# Patient Record
Sex: FEMALE | Race: WHITE | Employment: PART TIME | URBAN - METROPOLITAN AREA
[De-identification: names, ages, dates, MRNs, and addresses within clinical notes are randomized per-mention and may not be internally consistent; named-entity substitution may affect disease eponyms.]

---

## 2019-09-17 ENCOUNTER — TELEPHONE (OUTPATIENT)
Dept: SURGERY | Facility: CLINIC | Age: 34
End: 2019-09-17

## 2019-09-17 NOTE — TELEPHONE ENCOUNTER
Reason for call:  Other   Patient called regarding (reason for call): appointment  Additional comments: PT WENT TO [facility] SEMINAR 9/12 AND WOULD LIKE TO SCHEDULE     Phone number to reach patient:  Cell number on file:    Telephone Information:   Mobile [phone]       Best Time:  ANYTIME    Can we leave a detailed message on this number?  YES

## 2020-07-01 ENCOUNTER — TELEPHONE (OUTPATIENT)
Dept: SURGERY | Facility: CLINIC | Age: 35
End: 2020-07-01

## 2020-07-01 NOTE — TELEPHONE ENCOUNTER
Reason for call:  Other   Patient called regarding (reason for call): appointment  Additional comments: Please attach questionnaires for upcoming visit    Phone number to reach patient:  Home number on file [phone] (home)    Best Time:  Anytime     Can we leave a detailed message on this number?  Not Applicable    Travel screening: Not Applicable

## 2020-07-16 NOTE — PROGRESS NOTES
"New Bariatric Surgery Consultation Note  Video visit    2020    RE: Sherri Flores  MR#: [id number]  : 1985      Referring provider:       2020   Who referred you? Dr. Latosha Orozco (sp)       Chief Complaint/Reason for visit: evaluation for possible weight loss surgery    Dear Zay Varela MD (General),    I had the pleasure of seeing your patient, Sherri Flores, to evaluate her obesity and consider her for possible weight loss surgery. As you know, Sherri Flores is 35 year old.  She has a height of 5' 5\"[Pt reported[, a weight of 270 lbs 0 oz, and calculated Body mass index is 44.93 kg/m .        HISTORY OF PRESENT ILLNESS:  Weight Loss History Reviewed with Patient 2020   How long have you been overweight? Since early childhood   What is the most that you have ever weighed? 300   What is the most weight you have lost? 50   I have tried the following methods to lose weight Watching portions or calories, Exercise, Atkins type diet (low carb/high protein), Slimfast, OTC Medications   I have tried the following weight loss medications? (Check all that apply) None   Have you ever had weight loss surgery? No       CO-MORBIDITIES OF OBESITY INCLUDE:     2020   I have the following health issues associated with obesity: Pre-Diabetes, Suspected sleep apnea, Polycystic Ovarian Syndrome, Infertility, GERD (Reflux), Fatty Liver       PAST MEDICAL HISTORY:  Past Medical History:   Diagnosis Date     GERD (gastroesophageal reflux disease)      Infertility associated with anovulation      PCOS (polycystic ovarian syndrome)      Pre-diabetes        PAST SURGICAL HISTORY:  Past Surgical History:   Procedure Laterality Date     BREAST SURGERY      Reduction      ORTHOPEDIC SURGERY Right     Ankle surgery      ORTHOPEDIC SURGERY Right     Knee surgery      TONSILLECTOMY         FAMILY HISTORY:   No family history on file.    SOCIAL HISTORY:   Social History Questions Reviewed With " Patient 7/22/2020   Which best describes your employment status (select all that apply) I work part-time   If you work, what is your occupation? I am a    Which best describes your marital status: single   Do you have children? No   Who do you have in your support network that can be available to help you for the first 2 weeks after surgery? Mother and Father (both have hd the surgery themselves)   Who can you count on for support throughout your weight loss surgery journey? My boyfriend and family   Can you afford 3 meals a day?  Yes   Can you afford 50-60 dollars a month for vitamins? Yes       HABITS:     7/22/2020   How often do you drink alcohol? Monthly or less   If you do drink alcohol, how many drinks might you have in a day? (one drink = 5 oz. wine, 1 can/bottle of beer, 1 shot liquor) 1 or 2   Have you ever used any of the following nicotine products? No   Have you or are you currently using street drugs or prescription strength medication for which you do not have a prescription for? Yes- marijuana   Do you have a history of chemical dependency (alcohol or drug abuse)? No     Patient states her partner has seen her stop breathing in her sleep. She has never been tested for Sleep apnea.  Stop-bang score: 5  [place] sleepiness score: 7    PSYCHOLOGICAL HISTORY:   Psychological History Reviewed With Patient 7/22/2020   Have you ever attempted suicide? 2016   Have you had thoughts of suicide in the past year? No   Have you ever been hospitalized for mental illness or a suicide attempt? 2016   Do you have a history of chronic pain? Yes   Have you ever been diagnosed with fibromyalgia? Yes   Are you currently being treated for any of the following? (select all that apply) Depression, Anxiety, Bipolar   Are you currently seeing a therapist or counselor?  No   Are you currently seeing a psychiatrist? No       ROS:     7/22/2020   Skin:  Skin fold rashes (groin or other folds), Leg swelling, Varicose veins    HEENT: Headaches, Teeth- 1 broken tooth   Musculoskeletal: Joint Pain, Back pain, Limited mobility, Swelling of legs   Cardiovascular: Shortness of breath with activity   Pulmonary: Shortness of breath with activity, Snoring, Awaken from sleep to catch your breath, Excessively sleep during the day   Gastrointestinal: Heartburn, Reflux, Diarrhea, Constipation   Genitourinary: Stress incontinence (losing urine when coughing, sneezing, etc.)   Hematological: None of the above   Neurological: None of the above   Female only: Loss of menstrual cycles, Excessive menstrual bleeding, Irregular menstrual cycles, Polycystic ovarian syndrome (PCOS), Birth control       EATING BEHAVIORS:     7/22/2020   Have you or anyone else thought that you had an eating disorder? She admits binge eating weekly   If you answered yes to the previous eating disorder question, select the types that apply from this list: Binge Eating   Do you currently binge eat (eat a large amount of food in a short time)? Yes- about once a week   Are you an emotional eater? Yes   Do you get up to eat after falling asleep? No       EXERCISE:     7/22/2020   How often do you exercise? 1 to 2 times per week   What is the duration of your exercise (in minutes)? 20 Minutes   What types of exercise do you do? walking, climbing stairs at work, other   What keeps you from being more active?  Pain, My ability to walk or move around is limited, Shortness of breath, Too tired, Worried people will look at me       MEDICATIONS:  Current Outpatient Medications   Medication Sig Dispense Refill     amphetamine-dextroamphetamine (ADDERALL XR) 30 MG 24 hr capsule Take 30 mg by mouth       amphetamine-dextroamphetamine (ADDERALL) 20 MG tablet Take 20 mg by mouth       ARIPiprazole (ABILIFY) 10 MG tablet Take 10 mg by mouth daily       BIOTIN PO Take 5,000 mg by mouth       drospirenone-ethinyl estradiol (LUZ) 3-0.03 MG tablet Take 1 tablet by mouth daily       DULoxetine  "(CYMBALTA) 60 MG capsule Take 60 mg by mouth daily       lamoTRIgine (LAMICTAL) 25 MG tablet Take 25 mg by mouth daily       LORazepam (ATIVAN) 1 MG tablet Take 1 mg by mouth every 6 hours as needed for anxiety       metFORMIN (GLUCOPHAGE) 500 MG tablet Take 500 mg by mouth 3 times daily (with meals)       omeprazole (PRILOSEC) 20 MG DR capsule Take 20 mg by mouth daily       solifenacin (VESICARE) 10 MG tablet Take 10 mg by mouth daily       VITAMIN D PO Take 5,000 Units by mouth daily         ALLERGIES:  Allergies   Allergen Reactions     Amoxicillin      Cefzil [Cefprozil]      Clindamycin      Dilaudid [Hydromorphone] Nausea and Vomiting     Tegaderm Transparent Dressing (Informational Only)      Vicodin [Hydrocodone-Acetaminophen] Nausea and Vomiting       LABS/IMAGING/MEDICAL RECORDS REVIEW: A1C 6.4 on 9/13/19, normal CBC on 7/24/20. Severe hepatic steatosis on US 7/20    PHYSICAL EXAM:  Ht 5' 5\" (1.651 m)   Wt 270 lb (122.5 kg)   BMI 44.93 kg/m    EYES: Eyes grossly normal to inspection.  No discharge or erythema, or obvious scleral/conjunctival abnormalities.  RESP: No audible wheeze, cough, or visible cyanosis.  No visible retractions or increased work of breathing.    SKIN: Visible skin clear. No significant rash, abnormal pigmentation or lesions.  NEURO: Cranial nerves grossly intact.  Mentation and speech appropriate for age.  PSYCH: Mentation appears normal, affect normal/bright, judgement and insight intact, normal speech and appearance well-groomed.    In summary, Sherri Flores has Class III obesity with a body mass index of Body mass index is 44.93 kg/m . kg/m2 and the comorbidities stated above. She completed an informational seminar and is a candidate for the laparoscopic gastric bypass.  She will have to complete the following pre-requisites:    Received weight loss goal of 5 lb prior to surgery.  Achieve clearance from dietitian to see surgeon.  Have preoperative laboratory tests " drawn.  Psychological Evaluation with MMPI and clearance for weight loss surgery..    Today in the office we discussed gastric bypass surgery.  Preoperative, perioperative, and postoperative processes, management, and follow up were addressed.  Risks and benefits were outlined including the risk of death, staple line leak (1-2%), PE, DVT, ulcer, N/V, stricture, hernia, wound infection, weight regain, and vitamin deficiencies. I emphasized exercise and activity along with appropriate food choice as the main foundation for weight loss with surgery providing surgical reinforcement of this. All questions were answered.  A goal sheet and support group handout were given to the patient.     She will have a sleep study done.      Once the patient has completed the requirements in their task list and there are no further recommendations, the pt will be allowed to see the surgeon of her choice for consultation on the laparoscopic gastric bypass surgery. Patient verbalizes understanding of the process to surgery and expectations for the postoperative period including the need for lifelong lifestyle changes, vitamin supplementation, and laboratory monitoring.  Sincerely,     LEAH Fowler MD    I spent a total of 45 minutes face to face with the patient during today's office visit. Over 50% of this time was spent counseling the patient and/or coordinating care.

## 2020-07-27 ENCOUNTER — VIRTUAL VISIT (OUTPATIENT)
Dept: SURGERY | Facility: CLINIC | Age: 35
End: 2020-07-27
Payer: COMMERCIAL

## 2020-07-27 VITALS — BODY MASS INDEX: 44.98 KG/M2 | HEIGHT: 65 IN | WEIGHT: 270 LBS

## 2020-07-27 DIAGNOSIS — Z13.0 SCREENING FOR IRON DEFICIENCY ANEMIA: ICD-10-CM

## 2020-07-27 DIAGNOSIS — Z13.228 SCREENING FOR ENDOCRINE, NUTRITIONAL, METABOLIC AND IMMUNITY DISORDER: ICD-10-CM

## 2020-07-27 DIAGNOSIS — Z13.0 SCREENING FOR ENDOCRINE, NUTRITIONAL, METABOLIC AND IMMUNITY DISORDER: ICD-10-CM

## 2020-07-27 DIAGNOSIS — K21.9 GASTROESOPHAGEAL REFLUX DISEASE, ESOPHAGITIS PRESENCE NOT SPECIFIED: ICD-10-CM

## 2020-07-27 DIAGNOSIS — R73.03 PRE-DIABETES: ICD-10-CM

## 2020-07-27 DIAGNOSIS — E66.01 MORBID OBESITY (H): Primary | ICD-10-CM

## 2020-07-27 DIAGNOSIS — K76.0 HEPATIC STEATOSIS: ICD-10-CM

## 2020-07-27 DIAGNOSIS — Z13.21 SCREENING FOR ENDOCRINE, NUTRITIONAL, METABOLIC AND IMMUNITY DISORDER: ICD-10-CM

## 2020-07-27 DIAGNOSIS — E28.2 PCOS (POLYCYSTIC OVARIAN SYNDROME): ICD-10-CM

## 2020-07-27 DIAGNOSIS — E66.01 MORBID OBESITY (H): ICD-10-CM

## 2020-07-27 DIAGNOSIS — Z13.29 SCREENING FOR ENDOCRINE, NUTRITIONAL, METABOLIC AND IMMUNITY DISORDER: ICD-10-CM

## 2020-07-27 DIAGNOSIS — G47.30 SLEEP APNEA, UNSPECIFIED TYPE: ICD-10-CM

## 2020-07-27 PROBLEM — M79.7 FIBROMYALGIA: Status: ACTIVE | Noted: 2020-07-27

## 2020-07-27 PROBLEM — M50.30 DDD (DEGENERATIVE DISC DISEASE), CERVICAL: Status: ACTIVE | Noted: 2020-07-27

## 2020-07-27 PROCEDURE — 97802 MEDICAL NUTRITION INDIV IN: CPT | Mod: 95 | Performed by: DIETITIAN, REGISTERED

## 2020-07-27 PROCEDURE — 99243 OFF/OP CNSLTJ NEW/EST LOW 30: CPT | Mod: 95 | Performed by: FAMILY MEDICINE

## 2020-07-27 RX ORDER — ARIPIPRAZOLE 10 MG/1
10 TABLET ORAL DAILY
COMMUNITY

## 2020-07-27 RX ORDER — DROSPIRENONE AND ETHINYL ESTRADIOL 0.03MG-3MG
1 KIT ORAL DAILY
COMMUNITY

## 2020-07-27 RX ORDER — LAMOTRIGINE 25 MG/1
25 TABLET ORAL DAILY
COMMUNITY

## 2020-07-27 RX ORDER — DEXTROAMPHETAMINE SACCHARATE, AMPHETAMINE ASPARTATE MONOHYDRATE, DEXTROAMPHETAMINE SULFATE AND AMPHETAMINE SULFATE 7.5; 7.5; 7.5; 7.5 MG/1; MG/1; MG/1; MG/1
30 CAPSULE, EXTENDED RELEASE ORAL
COMMUNITY
Start: 2019-09-13

## 2020-07-27 RX ORDER — DEXTROAMPHETAMINE SACCHARATE, AMPHETAMINE ASPARTATE, DEXTROAMPHETAMINE SULFATE AND AMPHETAMINE SULFATE 5; 5; 5; 5 MG/1; MG/1; MG/1; MG/1
20 TABLET ORAL
COMMUNITY
Start: 2019-09-13

## 2020-07-27 RX ORDER — DULOXETIN HYDROCHLORIDE 60 MG/1
60 CAPSULE, DELAYED RELEASE ORAL DAILY
COMMUNITY

## 2020-07-27 RX ORDER — LORAZEPAM 1 MG/1
1 TABLET ORAL EVERY 6 HOURS PRN
COMMUNITY

## 2020-07-27 RX ORDER — SOLIFENACIN SUCCINATE 10 MG/1
10 TABLET, FILM COATED ORAL DAILY
COMMUNITY

## 2020-07-27 ASSESSMENT — MIFFLIN-ST. JEOR: SCORE: 1925.59

## 2020-07-27 NOTE — PROGRESS NOTES
"Sherri Flores is a 35 year old female who is being evaluated via a billable video visit.      The patient has been notified of following:     \"This video visit will be conducted via a call between you and your physician/provider. We have found that certain health care needs can be provided without the need for an in-person physical exam.  This service lets us provide the care you need with a video conversation.  If a prescription is necessary we can send it directly to your pharmacy.  If lab work is needed we can place an order for that and you can then stop by our lab to have the test done at a later time.    Video visits are billed at different rates depending on your insurance coverage.  Please reach out to your insurance provider with any questions.    If during the course of the call the physician/provider feels a video visit is not appropriate, you will not be charged for this service.\"    Patient has given verbal consent for Video visit? Yes  How would you like to obtain your AVS? MyChart  If you are dropped from the video visit, the video invite should be resent to: Text to cell phone: [phone]  Will anyone else be joining your video visit? No        Video-Visit Details    Type of service:  Video Visit    Video Start Time: 2:05  Video End Time: 2:51    Originating Location (pt. Location): Home    Distant Location (provider location):  [facility]     Platform used for Video Visit: Johnny"

## 2020-07-27 NOTE — PROGRESS NOTES
"Sherri Flores is a 35 year old female who is being evaluated via a billable video visit.      The patient has been notified of following:     \"This video visit will be conducted via a call between you and your physician/provider. We have found that certain health care needs can be provided without the need for an in-person physical exam.  This service lets us provide the care you need with a video conversation.  If a prescription is necessary we can send it directly to your pharmacy.  If lab work is needed we can place an order for that and you can then stop by our lab to have the test done at a later time.    Video visits are billed at different rates depending on your insurance coverage.  Please reach out to your insurance provider with any questions.    If during the course of the call the physician/provider feels a video visit is not appropriate, you will not be charged for this service.\"    Patient has given verbal consent for Video visit? Yes  How would you like to obtain your AVS? MyChart  If you are dropped from the video visit, the video invite should be resent to: Text to cell phone: [phone]  Will anyone else be joining your video visit? No      Video-Visit Details    Type of service:  Video Visit    Video Start Time: 1:12 pm  Video End Time: 1:57 PM    Originating Location (pt. Location): Home    Distant Location (provider location):  [facility]     Platform used for Video Visit: Rosa Fowler MD        "

## 2020-07-27 NOTE — PATIENT INSTRUCTIONS
Sherri Flores  July 27, 2020        Bariatric Task List      Required Weight loss:    Lose 5 lbs prior to surgery.  Goal Weight: 265# lbs    Tasks:    Have preoperative laboratory tests drawn.     Psychological Evaluation with MMPI and clearance for weight loss surgery.    Achieve clearance from dietitian to see surgeon.    A total of 6 structured dietitian weight loss visits are required by your insurance.    Sleep consult and sleep study. You will need to use your CPAP for 30 days prior to surgery and bring it to the hospital if you test positive for moderate-severe sleep apnea.

## 2020-07-27 NOTE — PROGRESS NOTES
"  Sherri Flores is a 35 year old female who is being evaluated via a billable video visit.      The patient has been notified of following:     \"This video visit will be conducted via a call between you and your physician/provider. We have found that certain health care needs can be provided without the need for an in-person physical exam.  This service lets us provide the care you need with a video conversation.  If a prescription is necessary we can send it directly to your pharmacy.  If lab work is needed we can place an order for that and you can then stop by our lab to have the test done at a later time.    Video visits are billed at different rates depending on your insurance coverage.  Please reach out to your insurance provider with any questions.    If during the course of the call the physician/provider feels a video visit is not appropriate, you will not be charged for this service.\"    Patient has given verbal consent for Video visit? Yes  How would you like to obtain your AVS? MyChart  If you are dropped from the video visit, the video invite should be resent to: Other e-mail: My chart  Will anyone else be joining your video visit? No      Video-Visit Details    Type of service:  Video Visit    Video Start Time: 2:05  Video End Time: 2:51    Originating Location (pt. Location): Home    Distant Location (provider location):  [facility] [facility]     Platform used for Video Visit: Johnny          New Bariatric Nutrition Consultation Note    Reason For Visit: Nutrition Assessment    Sherri Flores is a 35 year old presenting today for new bariatric nutrition consult.  Pt is interested in laparoscopic sleeve gastrectomy.  Patient is accompanied by self.    Support System Reviewed With Patient 7/22/2020   Who do you have in your support network that can be available to help you for the first 2 weeks after surgery? Mother and Father (both have hd the surgery themselves)   Who can you " "count on for support throughout your weight loss surgery journey? My boyfriend and family       ANTHROPOMETRICS:  Estimated body mass index is 44.93 kg/m  as calculated from the following:    Height as of an earlier encounter on 7/27/20: 1.651 m (5' 5\").    Weight as of an earlier encounter on 7/27/20: 122.5 kg (270 lb).    Required weight loss goal pre-op: 5 lbs from initial consult weight (goal weight 265 lbs or less before surgery)       7/22/2020   I have tried the following methods to lose weight Watching portions or calories, Exercise, Atkins type diet (low carb/high protein), Slimfast, OTC Medications       Weight Loss Questions Reviewed With Patient 7/22/2020   How long have you been overweight? Since early childhood       SUPPLEMENT INFORMATION:  5000 international unit(s)  vitamin D  1 tablet Biotin    NUTRITION HISTORY:  Recall Diet Questions Reviewed With Patient 7/22/2020   Describe what you typically consume for breakfast (typical or most recent): I usually don t eat breakfast / milk-due to Adderall 3X per week or cold cereal/ milk   Describe what you typically consume for lunch (typical or most recent): If i eat lunch its nuts and water or maybe something small   Describe what you typically consume for supper (typical or most recent): A party pizza, caesar salad, pasta   Describe what you typically consume as snacks (typical or most recent): Beef sticks, cheese, candy, pickles, veggies and dip   How many ounces of water, or other low calorie drinks, do you drink daily (8 oz=1 glass)? 48 oz   How many ounces of caffeine (coffee, tea, pop) do you drink daily (8 oz=1 glass)? 8 oz   How many ounces of carbonated (pop, beer, sparkling water) drinks do you drinky daily (8 oz=1 glass)? 24 oz-Pepsi, carbonated water   How many ounces of juice, pop, sweet tea, sports drinks, protein drinks, other sweetened drinks, do you drink daily (8 oz=1 glass)? 16 oz   How many ounces of milk do you drink daily (8 oz=1 " glass) 8 oz   Please indicate the type of milk: skim   How often do you drink alcohol? Monthly or less   If you do drink alcohol, how many drinks might you have in a day? (one drink = 5 oz. wine, 1 can/bottle of beer, 1 shot liquor) 1 or 2       Eating Habits 7/22/2020   Do you have any dietary restrictions? Yes   Do you currently binge eat (eat a large amount of food in a short time)? Yes-when Adderall wears off (5:30-9:30 pm). Not a true binge due to does not eat most of the day.   Are you an emotional eater? Yes-anxiety   Do you get up to eat after falling asleep? No     Both parents have had weight loss surgery with [facility]. Currently pt lives with her aunt, uncle and their 7 kids. Eating mainly frozen food and fresh veggies. After surgery she plant to move in with her significant other. Working as a nanny 3 days per week (eats 1 meal per day at dinner time) .Takes Adderall 3X per week and it suppresses appetite until dinner time and then overeats.    Dining Out History Reviewed With Patient 7/22/2020   How often do you dine out? A couple of times a week.   Where do you dine out? (select all that apply) sit-down restaurants, fast food chains   What types of food do you order when you dine out? Mostly pasta and salads       Physical Activity Reviewed With Patient 7/22/2020   How often do you exercise? 1 to 2 times per week   What is the duration of your exercise (in minutes)? 20 Minutes   What types of exercise do you do? walking, climbing stairs at work, other   What keeps you from being more active?  Pain, My ability to walk or move around is limited, Shortness of breath, Too tired, Worried people will look at me       NUTRITION DIAGNOSIS:  Obesity r/t long history of self-monitoring deficit and excessive energy intake aeb BMI >30 kg/m2.    INTERVENTION:  Intervention Provided/Education Provided on post-op diet guidelines, vitamins/minerals essential post-operatively, GI anatomy of bariatric  surgeries, ways to help prepare for post-op diet guidelines pre-operatively, portion/calorie-control, mindful eating .  Provided pt with list of goals RD contact information.      Questions Reviewed With Patient 7/22/2020   How ready are you to make changes regarding your weight? Number 1 = Not ready at all to make changes up to 10 = very ready. 10   How confident are you that you can change? 1 = Not confident that you will be successful making changes up to 10 = very confident. 10       Patient Understanding: good  Expected Compliance: fair-good    GOALS:  Focus on eating 3 meals per day (protein drink for 1 meal if desired)  Practice alternatives to emotional eating (singing, crafts,house projects)  Start: 1 multivitamin/ mineral and [id number] mg calcium citrate (2-3 separate doses) and take them 4 hours away from the multivitamin/ mineral.      Follow-Up:   Recommend 5 follow up visits to assist with lifestyle changes or per insurance.      Time spent with patient: 46 minutes.    Kunal Jackson RD, LUANN  [facility], [place]  [phone]

## 2020-07-27 NOTE — LETTER
Sherri Flores  July 27, 2020        Bariatric Task List      Required Weight loss:    Lose 5 lbs prior to surgery.  Goal Weight: 265# lbs  Tasks:  Have preoperative laboratory tests drawn.     Psychological Evaluation with MMPI and clearance for weight loss surgery.    Achieve clearance from dietitian to see surgeon.    A total of 6 structured dietitian weight loss visits are required by your insurance.

## 2020-09-29 ENCOUNTER — HOSPITAL ENCOUNTER (OUTPATIENT)
Dept: BEHAVIORAL HEALTH | Facility: CLINIC | Age: 35
End: 2020-09-29
Attending: FAMILY MEDICINE
Payer: COMMERCIAL

## 2020-09-29 ENCOUNTER — TELEPHONE (OUTPATIENT)
Dept: BEHAVIORAL HEALTH | Facility: CLINIC | Age: 35
End: 2020-09-29

## 2020-09-29 PROCEDURE — 40000007 ZZH STATISTIC ADULT CD FACE TO FACE-NO CHRG: Mod: GT | Performed by: SOCIAL WORKER

## 2020-09-29 NOTE — TELEPHONE ENCOUNTER
Tried reaching out to patient to get them checked in for their MH eval appointment scheduled for today, 9/29/2020, at 1130. Called, but no answer so a vm was left for patient to return call to check in.

## 2020-09-29 NOTE — PROGRESS NOTES
Assisted client to schedule gastric bypass psychological testing and psychiatry. She was given appointments for these requests.

## 2020-09-29 NOTE — TELEPHONE ENCOUNTER
Tried calling patient again, but had to leave another vm for patient to return call to check in. Also left outpatient intake's number in case patient would like to reschedule this appointment.

## 2020-10-19 ENCOUNTER — VIRTUAL VISIT (OUTPATIENT)
Dept: PSYCHOLOGY | Facility: CLINIC | Age: 35
End: 2020-10-19
Payer: COMMERCIAL

## 2020-10-19 DIAGNOSIS — F31.9 BIPOLAR DISORDER (H): Primary | ICD-10-CM

## 2020-10-19 PROCEDURE — 99207 PR NO BILLABLE SERVICE THIS VISIT: CPT | Mod: 95 | Performed by: PSYCHOLOGIST

## 2020-10-19 NOTE — PROGRESS NOTES
"                 Progress Note - Initial Session    Client Name:  Sherri Flores Date: 10/19/2020         Service Type: Phone Visit     Session Start Time: 1305 Session End Time: 1400     Session Length: 55    Session #: 1    Attendees: Client    Service Modality:  Phone Visit:    The patient has been notified of the following:      \"We have found that certain health care needs can be provided without the need for a face to face visit.  This service lets us provide the care you need with a phone conversation.       I will have full access to your [facility] medical record during this entire phone call.   I will be taking notes for your medical record.      Since this is like an office visit, we will bill your insurance company for this service.       There are potential benefits and risks of telephone visits (e.g. limits to patient confidentiality) that differ from in-person visits.?  Confidentiality still applies for telephone services, and nobody will record the visit.  It is important to be in a quiet, private space that is free of distractions (including cell phone or other devices) during the visit.??      If during the course of the call I believe a telephone visit is not appropriate, you will not be charged for this service\"     Consent has been obtained for this service by care team member: Yes        DATA:  Diagnostic Assessment in progress.  Unable to complete documentation at the conclusion of the first session due to amount of information need to complete DA for Bariatric Assessment.      Interactive Complexity: No  Crisis: No    Intervention:  During today's session, this writer outlined the purpose and expectations for the bariatric assessment including Notice of Billing. Ms. Flores shared her weight loss history and desire for surgery. This writer lead the clinical interview with questions of childhood. A second session was scheduled to complete the interview.     ASSESSMENT:  Mental Status " Assessment:  Appearance:   Appropriate   Eye Contact:   Good   Psychomotor Behavior: Normal   Attitude:   Cooperative   Orientation:   All  Speech   Rate / Production: Normal/ Responsive   Volume:  Normal   Mood:    Normal  Affect:    Appropriate   Thought Content:  Clear   Thought Form:  Goal Directed   Insight:    Good       Safety Issues and Plan for Safety and Risk Management:     [place] Suicide Severity Rating Scale (Short Version)  [place] Suicide Severity Rating (Short Version) 10/19/2020   Over the past 2 weeks have you felt down, depressed, or hopeless? no   Over the past 2 weeks have you had thoughts of killing yourself? no   Have you ever attempted to kill yourself? yes   When did this last happen? more than 6 months ago     Patient denies current fears or concerns for personal safety.  Patient denies current or recent suicidal ideation or behaviors.  Patient denies current or recent homicidal ideation or behaviors.  Patient denies current or recent self injurious behavior or ideation.  Patient denies other safety concerns.  Recommended that patient call 911 or go to the local ED should there be a change in any of these risk factors.     Diagnostic Criteria:  Hx of Bipolar Disorder    WHODAS 2.0 (12 item):   WHODAS 2.0 Total Score 10/19/2020   Total Score 27   Total Score [facility] 27     Collateral Reports Completed:  Routed note to Care Team Member(s)      PLAN:   -Complete Intake  -Administered MMPI    Jesika Lopez, PhD LP  October 19, 2020

## 2020-10-26 ENCOUNTER — TELEPHONE (OUTPATIENT)
Dept: SURGERY | Facility: CLINIC | Age: 35
End: 2020-10-26

## 2020-10-26 ENCOUNTER — VIRTUAL VISIT (OUTPATIENT)
Dept: PSYCHOLOGY | Facility: CLINIC | Age: 35
End: 2020-10-26
Payer: COMMERCIAL

## 2020-10-26 DIAGNOSIS — F31.75 BIPOLAR DISORDER, IN PARTIAL REMISSION, MOST RECENT EPISODE DEPRESSED (H): Primary | ICD-10-CM

## 2020-10-26 PROCEDURE — 99207 PR NO BILLABLE SERVICE THIS VISIT: CPT | Mod: 95 | Performed by: PSYCHOLOGIST

## 2020-10-26 NOTE — TELEPHONE ENCOUNTER
Reason for call:  Other   Patient called regarding (reason for call): call back  Additional comments: Patient would like a call back to discuss her psychology appointments and exactly how many she needs to complete for the program.     Phone number to reach patient:  Home number on file [phone] (home)    Best Time:  Anytime     Can we leave a detailed message on this number?  YES    Travel screening: Not Applicable

## 2020-10-26 NOTE — TELEPHONE ENCOUNTER
I called patient back and left a message that the psychologist determines how many visits are needed. Patient to call back if she has further questions.

## 2020-10-26 NOTE — PROGRESS NOTES
Progress Note - Initial Session    Client Name:  Sherri Flores Date: 10/26/2020         Service Type: Individual     Session Start Time: 1100  Session End Time: 1156     Session Length: 56    Session #: 2    Attendees: Client    Service Modality:  Video Visit:    Telemedicine Visit: The patient's condition can be safely assessed and treated via synchronous audio and visual telemedicine encounter.      Reason for Telemedicine Visit: Services only offered telehealth    Originating Site (Patient Location): Patient's place of employment    Distant Site (Provider Location): Provider Remote Setting    Consent:  The patient/guardian has verbally consented to: the potential risks and benefits of telemedicine (video visit) versus in person care; bill my insurance or make self-payment for services provided; and responsibility for payment of non-covered services.     Patient would like the video invitation sent by: Send to e-mail at: [email or web address]    Mode of Communication:  Video Conference via [employer]    As the provider I attest to compliance with applicable laws and regulations related to telemedicine.       DATA:  Diagnostic Assessment in progress.  Unable to complete documentation at the conclusion of the first session due to amount of information needed to Bariatric Eval      Interactive Complexity: No  Crisis: No    Intervention:  During today's session this writer continued collecting background information. Ms. Flores provided details about her relationships, work history and some mental health symptoms. Additional information is needed for full mental health history due to complexities. She was scheduled for a 3rd session and feedback session moved from 11/16 to 12/01. No homework was assigned.      ASSESSMENT:  Mental Status Assessment:  Appearance:   Appropriate   Eye Contact:   Good   Psychomotor Behavior: Normal   Attitude:   Cooperative   Orientation:   All  Speech   Rate /  Production: Normal/ Responsive   Volume:  Normal   Mood:    Normal  Affect:    Restricted   Thought Content:  Clear   Thought Form:  Goal Directed   Insight:    Fair       Safety Issues and Plan for Safety and Risk Management:     [place] Suicide Severity Rating Scale (Short Version)  [place] Suicide Severity Rating (Short Version) 10/19/2020   Over the past 2 weeks have you felt down, depressed, or hopeless? no   Over the past 2 weeks have you had thoughts of killing yourself? no   Have you ever attempted to kill yourself? yes   When did this last happen? more than 6 months ago     Patient denies current fears or concerns for personal safety.  Patient denies current or recent suicidal ideation or behaviors.  Patient denies current or recent homicidal ideation or behaviors.  Patient denies current or recent self injurious behavior or ideation.  Patient denies other safety concerns.  Recommended that patient call 911 or go to the local ED should there be a change in any of these risk factors.    Diagnostic Criteria:  Bipolar per History  Anxiety  Trauma  BPD features    WHODAS 2.0 (12 item):   WHODAS 2.0 Total Score 10/19/2020   Total Score MyChart 27   Some encounter information is confidential and restricted. Go to Review Flowsheets activity to see all data.     Collateral Reports Completed:  Routed note to PCP      PLAN: (Homework, other):  -Complete DA  -Assign homework  -Administered MMPI      Jesika Lopez, PhD LP  October 26, 2020

## 2020-10-28 NOTE — PROGRESS NOTES
"Sherri Flores is a 35 year old female who is being evaluated via a billable video visit.      The patient has been notified of following:     \"This video visit will be conducted via a call between you and your physician/provider. We have found that certain health care needs can be provided without the need for an in-person physical exam.  This service lets us provide the care you need with a video conversation.  If a prescription is necessary we can send it directly to your pharmacy.  If lab work is needed we can place an order for that and you can then stop by our lab to have the test done at a later time.    Video visits are billed at different rates depending on your insurance coverage.  Please reach out to your insurance provider with any questions.    If during the course of the call the physician/provider feels a video visit is not appropriate, you will not be charged for this service.\"    Patient has given verbal consent for Video visit? Yes  How would you like to obtain your AVS? MyChart  If you are dropped from the video visit, the video invite should be resent to: Text to cell phone: [phone]  Will anyone else be joining your video visit? No        Video-Visit Details    Type of service:  Video Visit    Video Start Time: 1:30pm  Video End Time: 1:56pm    Originating Location (pt. Location): Home    Distant Location (provider location):  [facility] [place]     Platform used for Video Visit: [employer]    PRE SURGICAL WEIGHT LOSS NUTRITION APPOINTMENT    Sherri Flores  1985  female  [id number]  35 year old    ASSESSMENT    Desired Surgical Procedure: gastric bypass    REASON FOR VISIT:  Sherri Flores is a 35 year old year old female presents today for a pre-surgical weight loss follow-up appointment. Patient accompanied by self.    DIAGNOSIS:  Weight Status Obesity Grade III BMI >40    ANTHROPOMETRICS:  Height: 65\"   Initial Weight: 270 lbs     Weight last visit: 270 " lbs    Current weight: n/a - see notes      VITAMINS AND MINERALS:  1 Multivitamin with Minerals  (pt unsure of dose) mg Calcium with Vitamin D BID (separate from multivitamin)  10,000 International units Vitamin D      NUTRITION HISTORY:  Breakfast: eggs +/- sausage or toast w/butter  Lunch: (skips) or fruits/vegetables + nuts  Supper: stir covarrubias  roast + vegetables + potatoes  chicken + vegetables  tortilla soup   Snacks: nuts, string cheese, salami + cheese, chips, pickles   Fluids Consumed: water (64-100oz), carbonated water, gatorade zero   Eating slower: Sometimes  Chewing foods thoroughly: Sometimes  Take 20-30 minutes to consume each meal: Sometimes  Fluids and meals separate by at least 30 minutes: Sometimes  Carbonation: yes  Caffeine: none  Additional Information: Pt driving during visit - she is headed to an emergency dental facility for a broken tooth. Verbalizes that she feels safe to proceed with visit. Pt occasionally struggles to eat breakfast and lunch d/t suppressed appetite from Adderall as well as N/V (recently started Zofran, however). Did not obtain weight as patient was being pulled over toward end of visit - had to abruptly end call.     PHYSICAL ACTIVITY:  No intentional exercise; active at job 3x/week    DIAGNOSIS:  Previous Nutrition Diagnosis: Obesity related to long history of self- monitoring deficit and excessive energy intake evidenced by BMI of 44.93 kg/m2  No change, modified below    Previous goals:   Focus on eating 3 meals per day (protein drink for 1 meal if desired) - improving  Practice alternatives to emotional eating (singing, crafts,house projects) - improving  Start: 1 multivitamin/ mineral and 0032-1918 mg calcium citrate (2-3 separate doses) and take them 4 hours away from the multivitamin/ mineral. - met    Current Nutrition Diagnosis: Obesity related to long history of self-monitoring deficit and excessive energy intake as evidenced by BMI of >40  kg/m2.    INTERVENTION:  Nutrition Prescription: Recommended energy/nutrient modification.    GOALS:  Verify dose of calcium  Eat 3 balanced meals/day  Eliminate carbonated beverages  Take at least 20 minutes to eat meals  Walk on treadmill on work breakfast    Intervention:  - Discussed progress towards previous goals.  - Reinforced importance of making behavior changes in preparation for bariatric surgery.   - Assessed learning needs and learning preferences       NUTRITION MONITORING AND EVALUATION:  Anticipated compliance: fair-good  Patient demonstrated fair-good understanding.     Follow up: Continue to monitor patient closely regarding weight loss and diet.  # of visits needed: 4  Cleared by RD: No     TIME SPENT WITH PATIENT: 26 minutes    Farheen Sanchez RD, LD  Clinical Dietitian       JOSE ANDERSON      Physical Exam

## 2020-10-29 ENCOUNTER — VIRTUAL VISIT (OUTPATIENT)
Dept: SURGERY | Facility: CLINIC | Age: 35
End: 2020-10-29
Payer: COMMERCIAL

## 2020-10-29 DIAGNOSIS — E66.01 MORBID OBESITY (H): ICD-10-CM

## 2020-10-29 PROCEDURE — 97803 MED NUTRITION INDIV SUBSEQ: CPT | Mod: 95 | Performed by: DIETITIAN, REGISTERED

## 2020-11-03 ENCOUNTER — VIRTUAL VISIT (OUTPATIENT)
Dept: PSYCHOLOGY | Facility: CLINIC | Age: 35
End: 2020-11-03
Payer: COMMERCIAL

## 2020-11-03 DIAGNOSIS — F31.75 BIPOLAR DISORDER, IN PARTIAL REMISSION, MOST RECENT EPISODE DEPRESSED (H): Primary | ICD-10-CM

## 2020-11-03 PROCEDURE — 90791 PSYCH DIAGNOSTIC EVALUATION: CPT | Mod: 95 | Performed by: PSYCHOLOGIST
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
## 2020-11-18 ENCOUNTER — TELEPHONE (OUTPATIENT)
Dept: SURGERY | Facility: CLINIC | Age: 35
End: 2020-11-18

## 2020-11-18 NOTE — TELEPHONE ENCOUNTER
Called patient back to discuss surgery process questions. Patient states she saw GI yesterday and has a very enlarged liver. GI doctor said patient should be considered medically necessary and have her surgery ASAP.  I told patient she would have to call insurance to discuss if they have any policy to bypass requirements for Bariatric surgery. Also our providers would have to look at as well. Certain requirements must be completed.     Patient stated that Covid should not push back her surgery as it is emergent. I let patient know that the surgeon would decided the Tier that her surgery would be put in. Patient is mainly looking to bypass the 6 month dietician requirement and will speak with insurance. I also let patient know that I believe her policy requires 6 months consecutive dietician visits. Patient has a gap in visits so I suggested she verify with insurance if that is allowed per her policy. Patient said she will ask insurance.     Patient also states she completed her labs. I let patient know I will notify provider to review labs and sign off if completed. Patient to call back if any additional questions,

## 2020-11-18 NOTE — TELEPHONE ENCOUNTER
Reason for call:  Other   Patient called regarding (reason for call): call back  Additional comments: pt has questions about surg process and ins    Phone number to reach patient:  Cell number on file:  [phone]  No relevant phone numbers on file.       Best Time:  anytime    Can we leave a detailed message on this number?  YES    Travel screening: Not Applicable

## 2020-11-30 ENCOUNTER — TELEPHONE (OUTPATIENT)
Dept: PSYCHOLOGY | Facility: CLINIC | Age: 35
End: 2020-11-30
Payer: COMMERCIAL

## 2020-11-30 NOTE — TELEPHONE ENCOUNTER
This writer left Ms. Flores a voicemail regarding unanswered emails to complete the MMPI for her feedback session tomorrow as part of her bariatric assessment. The session will need to be rescheduled.

## 2021-01-15 ENCOUNTER — HEALTH MAINTENANCE LETTER (OUTPATIENT)
Age: 36
End: 2021-01-15

## 2021-01-25 ENCOUNTER — TELEPHONE (OUTPATIENT)
Dept: SURGERY | Facility: CLINIC | Age: 36
End: 2021-01-25

## 2021-01-25 ENCOUNTER — VIRTUAL VISIT (OUTPATIENT)
Dept: SURGERY | Facility: CLINIC | Age: 36
End: 2021-01-25
Payer: COMMERCIAL

## 2021-01-25 NOTE — PROGRESS NOTES
NO visit/ no charge      Pt scheduled for 3pm virtual pre-op nutrition appointment:    Virtual waiting room x10 minutes    Called pt at 3:10pm - pt states she forgot but will immediately log in    Called pt at 3:15pm as still not connected to virtual software - no answer, voicemail not set up    Called again at 3:17pm - pt unable to get into [place].     Recommended pt reschedule visit to allow for adequate time, need at least 20 minutes to do a full visit. Pt verbalizes understanding and will schedule for later this week.     Farheen Sanchez, RUBINA, LD  Clinical Dietitian

## 2021-01-26 ENCOUNTER — DOCUMENTATION ONLY (OUTPATIENT)
Dept: SURGERY | Facility: CLINIC | Age: 36
End: 2021-01-26

## 2021-01-26 NOTE — PROGRESS NOTES
Patient has stated that she needs needs urgent surgery due to an enlarged liver when she was seen recently by GI.   Per discussion with Dr. Fowler - an enlarged liver wouldn't necessitate an urgent surgery and bypassing guidelines. The best thing a patient can do is to shrink the large liver with weight loss prior to surgery.    This will also decrease the surgical risk when she has bariatric surgery.  Pauline Sherman, MS, RD, RN

## 2021-10-24 ENCOUNTER — HEALTH MAINTENANCE LETTER (OUTPATIENT)
Age: 36
End: 2021-10-24

## 2022-02-13 ENCOUNTER — HEALTH MAINTENANCE LETTER (OUTPATIENT)
Age: 37
End: 2022-02-13

## 2022-02-17 PROBLEM — K21.9 GASTROESOPHAGEAL REFLUX DISEASE: Status: ACTIVE | Noted: 2020-07-27

## 2022-04-05 ENCOUNTER — TELEPHONE (OUTPATIENT)
Dept: PSYCHOLOGY | Facility: CLINIC | Age: 37
End: 2022-04-05
Payer: COMMERCIAL

## 2022-04-05 DIAGNOSIS — E66.01 MORBID OBESITY (H): Primary | ICD-10-CM

## 2022-04-27 PROBLEM — K21.9 GASTROESOPHAGEAL REFLUX DISEASE: Status: RESOLVED | Noted: 2020-07-27 | Resolved: 2022-04-27

## 2022-10-15 ENCOUNTER — HEALTH MAINTENANCE LETTER (OUTPATIENT)
Age: 37
End: 2022-10-15

## 2023-03-26 ENCOUNTER — HEALTH MAINTENANCE LETTER (OUTPATIENT)
Age: 38
End: 2023-03-26

## 2024-05-26 ENCOUNTER — HEALTH MAINTENANCE LETTER (OUTPATIENT)
Age: 39
End: 2024-05-26